# Patient Record
(demographics unavailable — no encounter records)

---

## 2024-10-10 NOTE — DISCUSSION/SUMMARY
[FreeTextEntry1] : #Pelvic Prolapse: -Continue with Gellhorn LS # 2 3/4. -Precaution and instructions were reviewed.  Patient will follow up in office 3 months or sooner for pelvic prolapse.  If pt have any problems/concern to call office.  Patient verbalizes understanding and agrees.

## 2024-10-10 NOTE — PROCEDURE
[Good Fit] : fits well [Erythema] : erythema noted [Discharge] : there is vaginal discharge [Pessary Inserted] : inserted [Pessary Washed] : washed [None] : no bleeding [Medication Review] : Medicaiton Review: Patient verbalizes understanding of risks and benefits [Bowel Management] : Bowel Management: patient verbalizes understanding of daily dietary fiber intake [Refit] : refit is not needed [Erosion] : no evidence of erosion [Infection] : no evidence of infection [FreeTextEntry1] : Marilee GOLDSTEIN # 2 3/4 [de-identified] : posterior and lateral walls  [FreeTextEntry3] : Replens [FreeTextEntry8] : Reviewed pericare

## 2024-10-10 NOTE — HISTORY OF PRESENT ILLNESS
[FreeTextEntry1] : Amanda is a 80y/o with hx of pelvic prolapse presents to the office for follow up.  Prolapse supported with Gellhorn LS # 2 3/4.  Patient comfortable with support.  She denies any pain or discomfort. Voiding and moving BM without problems.

## 2024-10-10 NOTE — PROCEDURE
[Good Fit] : fits well [Erythema] : erythema noted [Discharge] : there is vaginal discharge [Pessary Inserted] : inserted [Pessary Washed] : washed [None] : no bleeding [Medication Review] : Medicaiton Review: Patient verbalizes understanding of risks and benefits [Bowel Management] : Bowel Management: patient verbalizes understanding of daily dietary fiber intake [Refit] : refit is not needed [Erosion] : no evidence of erosion [Infection] : no evidence of infection [FreeTextEntry1] : Marilee GOLDSTEIN # 2 3/4 [de-identified] : posterior and lateral walls  [FreeTextEntry3] : Replens [FreeTextEntry8] : Reviewed pericare

## 2024-10-10 NOTE — PHYSICAL EXAM
[No Acute Distress] : in no acute distress [Well developed] : well developed [Well Nourished] : ~L well nourished [Good Hygeine] : demonstrates good hygeine [Oriented x3] : oriented to person, place, and time [Normal Memory] : ~T memory was ~L unimpaired [Normal Mood/Affect] : mood and affect are normal [Warm and Dry] : was warm and dry to touch [Normal Gait] : gait was normal [Vulvar Atrophy] : vulvar atrophy [Labia Majora] : were normal [Labia Minora] : were normal [Normal] : was normal [Atrophy] : atrophy [Cystocele] : a cystocele [Uterine Prolapse] : uterine prolapse [Discharge] : a  ~M vaginal discharge was present [Scant] : scant [Marcos] : yellow [Thin] : thin [No Bleeding] : there was no active vaginal bleeding [Anxiety] : patient is not anxious

## 2024-10-10 NOTE — HISTORY OF PRESENT ILLNESS
[FreeTextEntry1] : Amanda is a 78y/o with hx of pelvic prolapse presents to the office for follow up.  Prolapse supported with Gellhorn LS # 2 3/4.  Patient comfortable with support.  She denies any pain or discomfort. Voiding and moving BM without problems.

## 2025-01-17 NOTE — PROCEDURE
[Good Fit] : fits well [Erythema] : erythema noted [Discharge] : there is vaginal discharge [Pessary Inserted] : inserted [Pessary Washed] : washed [None] : no bleeding [Medication Review] : Medicaiton Review: Patient verbalizes understanding of risks and benefits [Bowel Management] : Bowel Management: patient verbalizes understanding of daily dietary fiber intake [Refit] : refit is not needed [Erosion] : no evidence of erosion [Infection] : no evidence of infection [FreeTextEntry1] : Marilee GOLDSTEIN # 2 3/4 [de-identified] : posterior and lateral walls  [FreeTextEntry3] : Replens [FreeTextEntry8] : Reviewed pericare

## 2025-01-17 NOTE — PROCEDURE
[Good Fit] : fits well [Erythema] : erythema noted [Discharge] : there is vaginal discharge [Pessary Inserted] : inserted [Pessary Washed] : washed [None] : no bleeding [Medication Review] : Medicaiton Review: Patient verbalizes understanding of risks and benefits [Bowel Management] : Bowel Management: patient verbalizes understanding of daily dietary fiber intake [Refit] : refit is not needed [Erosion] : no evidence of erosion [Infection] : no evidence of infection [FreeTextEntry1] : Marilee GOLDSTEIN # 2 3/4 [de-identified] : posterior and lateral walls  [FreeTextEntry3] : Replens [FreeTextEntry8] : Reviewed pericare